# Patient Record
Sex: MALE | Employment: FULL TIME | URBAN - METROPOLITAN AREA
[De-identification: names, ages, dates, MRNs, and addresses within clinical notes are randomized per-mention and may not be internally consistent; named-entity substitution may affect disease eponyms.]

---

## 2017-10-06 ENCOUNTER — HOSPITAL ENCOUNTER (EMERGENCY)
Facility: CLINIC | Age: 22
Discharge: HOME | End: 2017-10-07
Attending: EMERGENCY MEDICINE | Admitting: EMERGENCY MEDICINE
Payer: COMMERCIAL

## 2017-10-06 DIAGNOSIS — R07.89 CHEST WALL PAIN: ICD-10-CM

## 2017-10-06 PROCEDURE — 99284 EMERGENCY DEPT VISIT MOD MDM: CPT | Mod: 25

## 2017-10-06 PROCEDURE — 93005 ELECTROCARDIOGRAM TRACING: CPT

## 2017-10-06 NOTE — ED AVS SNAPSHOT
Emergency Department    [address] [place] [id number]    Phone:  [phone]    Fax:  [phone]                                       Noe Jordan   MRN: [id number]    Department:   Emergency Department   Date of Visit:  10/6/2017           Patient Information     Date Of Birth          1995        Your diagnoses for this visit were:     Chest wall pain        You were seen by Jay Ang MD.      Follow-up Information     Follow up with No Ref-Primary, Physician In 1 week.    Why:  As needed        Discharge Instructions       Discharge Instructions  Chest Pain    You have been seen today for chest pain or discomfort.  At this time, your doctor has found no signs that your chest pain is due to a serious or life-threatening condition, (or you have declined more testing and/or admission to the hospital). However, sometimes there is a serious problem that does not show up right away. Your evaluation today may not be complete and you may need further testing and evaluation.     You need to follow-up with your regular doctor within 3 days.    Return to the Emergency Department if:    Your chest pain changes, gets worse, starts to happen more often, or comes with less activity.    You are short of breath.    You get very weak or tired.    You pass out or faint.    You have any new symptoms, like fever, cough, numb legs, or you cough up blood.    You have anything else that worries you.    Until you follow-up with your regular doctor please do the following:    Take one aspirin daily unless you have an allergy or are told not to by your doctor.    If a stress test appointment has been made, go to the appointment.    If you have questions, contact your regular doctor.    If your doctor today has told you to follow-up with your regular doctor, it is very important that you make an appointment with your clinic and go to the appointment.  If you do not follow-up with your  primary doctor, it may result in missing an important development which could result in permanent injury or disability and/or lasting pain.  If there is any problem keeping your appointment, call your doctor or return to the Emergency Department.    If you were given a prescription for medicine here today, be sure to read all of the information (including the package insert) that comes with your prescription.  This will include important information about the medicine, its side effects, and any warnings that you need to know about.  The pharmacist who fills the prescription can provide more information and answer questions you may have about the medicine.  If you have questions or concerns that the pharmacist cannot address, please call or return to the Emergency Department.     Opioid Medication Information    Pain medications are among the most commonly prescribed medicines, so we are including this information for all our patients. If you did not receive pain medication or get a prescription for pain medicine, you can ignore it.     You may have been given a prescription for an opioid (narcotic) pain medicine and/or have received a pain medicine while here in the Emergency Department. These medicines can make you drowsy or impaired. You must not drive, operate dangerous equipment, or engage in any other dangerous activities while taking these medications. If you drive while taking these medications, you could be arrested for DUI, or driving under the influence. Do not drink any alcohol while you are taking these medications.     Opioid pain medications can cause addiction. If you have a history of chemical dependency of any type, you are at a higher risk of becoming addicted to pain medications.  Only take these prescribed medications to treat your pain when all other options have been tried. Take it for as short a time and as few doses as possible. Store your pain pills in a secure place, as they are frequently  stolen and provide a dangerous opportunity for children or visitors in your house to start abusing these powerful medications. We will not replace any lost or stolen medicine.  As soon as your pain is better, you should flush all your remaining medication.     Many prescription pain medications contain Tylenol  (acetaminophen), including Vicodin , Tylenol #3 , Norco , Lortab , and Percocet .  You should not take any extra pills of Tylenol  if you are using these prescription medications or you can get very sick.  Do not ever take more than 3000 mg of acetaminophen in any 24 hour period.    All opioids tend to cause constipation. Drink plenty of water and eat foods that have a lot of fiber, such as fruits, vegetables, prune juice, apple juice and high fiber cereal.  Take a laxative if you don t move your bowels at least every other day. Miralax , Milk of Magnesia, Colace , or Senna  can be used to keep you regular.      Remember that you can always come back to the Emergency Department if you are not able to see your regular doctor in the amount of time listed above, if you get any new symptoms, or if there is anything that worries you.          24 Hour Appointment Hotline       To make an appointment at any [facility], call [phone] ([phone]). If you don't have a family doctor or clinic, we will help you find one. [facility] are conveniently located to serve the needs of you and your family.             Review of your medicines      START taking        Dose / Directions Last dose taken    HYDROcodone-acetaminophen 5-325 MG per tablet   Commonly known as:  NORCO   Dose:  1-2 tablet   Quantity:  20 tablet        Take 1-2 tablets by mouth every 4 hours as needed for moderate to severe pain maximum 6 tablet(s) per day   Refills:  0          Our records show that you are taking the medicines listed below. If these are incorrect, please call your family doctor or clinic.        Dose / Directions  Last dose taken    CITALOPRAM HYDROBROMIDE PO   Dose:  10 mg        Take 10 mg by mouth   Refills:  0        ibuprofen 600 MG tablet   Commonly known as:  ADVIL/MOTRIN   Dose:  600 mg   Quantity:  30 tablet        Take 1 tablet (600 mg) by mouth every 6 hours as needed for moderate pain   Refills:  1        Psyllium 48.57 % Powd        Refills:  0        TYLENOL PO   Dose:  1000 mg        Take 1,000 mg by mouth   Refills:  0        VITAMIN D3 PO   Dose:  1000 Units        Take 1,000 Units by mouth daily   Refills:  0                Prescriptions were sent or printed at these locations (2 Prescriptions)                   Other Prescriptions                Printed at Department/Unit printer (2 of 2)         ibuprofen (ADVIL/MOTRIN) 600 MG tablet               HYDROcodone-acetaminophen (NORCO) 5-325 MG per tablet                Procedures and tests performed during your visit     Chest XR,  PA & LAT    EKG 12 lead      Orders Needing Specimen Collection     None      Pending Results     No orders found for last 3 day(s).            Pending Culture Results     No orders found for last 3 day(s).            Pending Results Instructions     If you had any lab results that were not finalized at the time of your Discharge, you can call the ED Lab Result RN at [phone]. You will be contacted by this team for any positive Lab results or changes in treatment. The nurses are available 7 days a week from 10A to 6:30P.  You can leave a message 24 hours per day and they will return your call.        Test Results From Your Hospital Stay        10/7/2017 12:48 AM      Narrative     XR CHEST 2 VW   10/7/2017 12:41 AM     INDICATION: Chest pain.    COMPARISON: 10/15/2016.        Impression     IMPRESSION: No infiltrates or other acute findings. Heart size is  within normal limits. No pneumothorax. No change.    TAMY PANG MD                Clinical Quality Measure: Blood Pressure Screening     Your blood pressure was checked  "while you were in the emergency department today. The last reading we obtained was  BP: 142/85 . Please read the guidelines below about what these numbers mean and what you should do about them.  If your systolic blood pressure (the top number) is less than 120 and your diastolic blood pressure (the bottom number) is less than 80, then your blood pressure is normal. There is nothing more that you need to do about it.  If your systolic blood pressure (the top number) is 120-139 or your diastolic blood pressure (the bottom number) is 80-89, your blood pressure may be higher than it should be. You should have your blood pressure rechecked within a year by a primary care provider.  If your systolic blood pressure (the top number) is 140 or greater or your diastolic blood pressure (the bottom number) is 90 or greater, you may have high blood pressure. High blood pressure is treatable, but if left untreated over time it can put you at risk for heart attack, stroke, or kidney failure. You should have your blood pressure rechecked by a primary care provider within the next 4 weeks.  If your provider in the emergency department today gave you specific instructions to follow-up with your doctor or provider even sooner than that, you should follow that instruction and not wait for up to 4 weeks for your follow-up visit.        Thank you for choosing [facility]       Thank you for choosing [facility] for your care. Our goal is always to provide you with excellent care. Hearing back from our patients is one way we can continue to improve our services. Please take a few minutes to complete the written survey that you may receive in the mail after you visit with us. Thank you!        [employer] Information     [employer] lets you send messages to your doctor, view your test results, renew your prescriptions, schedule appointments and more. To sign up, go to [email or web address] . Click on \"Log in\" on the left side of the screen, which " "will take you to the Welcome page. Then click on \"Sign up Now\" on the right side of the page.     You will be asked to enter the access code listed below, as well as some personal information. Please follow the directions to create your username and password.     Your access code is: [email or web address]  Expires: 2018  1:57 AM     Your access code will  in 90 days. If you need help or a new code, please call your [facility] clinic or [phone].        Care EveryWhere ID     This is your Care EveryWhere ID. This could be used by other organizations to access your [facility] medical records  [id number]        Equal Access to Services     VANESSA CARDENAS : Hong Finney, karina griffith, shmuel rodríguez, farhana bourne. So [facility] [phone].    ATENCIÓN: Si habla español, tiene a phoenix disposición servicios gratuitos de asistencia lingüística. Llame al [phone].    We comply with applicable federal civil rights laws and [place] laws. We do not discriminate on the basis of race, color, national origin, age, disability, sex, sexual orientation, or gender identity.            After Visit Summary       This is your record. Keep this with you and show to your community pharmacist(s) and doctor(s) at your next visit.                  "

## 2017-10-06 NOTE — ED AVS SNAPSHOT
Emergency Department    [address] [place] [address]    Phone:  [phone]    Fax:  [phone]                                       Noe Jordan   MRN: [id number]    Department:   Emergency Department   Date of Visit:  10/6/2017           After Visit Summary Signature Page     I have received my discharge instructions, and my questions have been answered. I have discussed any challenges I see with this plan with the nurse or doctor.    ..........................................................................................................................................  Patient/Patient Representative Signature      ..........................................................................................................................................  Patient Representative Print Name and Relationship to Patient    ..................................................               ................................................  Date                                            Time    ..........................................................................................................................................  Reviewed by Signature/Title    ...................................................              ..............................................  Date                                                            Time

## 2017-10-07 ENCOUNTER — APPOINTMENT (OUTPATIENT)
Dept: GENERAL RADIOLOGY | Facility: CLINIC | Age: 22
End: 2017-10-07
Attending: EMERGENCY MEDICINE
Payer: COMMERCIAL

## 2017-10-07 VITALS
SYSTOLIC BLOOD PRESSURE: 127 MMHG | HEIGHT: 69 IN | WEIGHT: 175.71 LBS | OXYGEN SATURATION: 100 % | HEART RATE: 73 BPM | TEMPERATURE: 98 F | DIASTOLIC BLOOD PRESSURE: 70 MMHG | RESPIRATION RATE: 18 BRPM | BODY MASS INDEX: 26.02 KG/M2

## 2017-10-07 LAB — INTERPRETATION ECG - MUSE: NORMAL

## 2017-10-07 PROCEDURE — 25000132 ZZH RX MED GY IP 250 OP 250 PS 637: Performed by: EMERGENCY MEDICINE

## 2017-10-07 PROCEDURE — 71020 XR CHEST 2 VW: CPT

## 2017-10-07 RX ORDER — HYDROCODONE BITARTRATE AND ACETAMINOPHEN 5; 325 MG/1; MG/1
1-2 TABLET ORAL EVERY 4 HOURS PRN
Qty: 20 TABLET | Refills: 0 | Status: SHIPPED | OUTPATIENT
Start: 2017-10-07

## 2017-10-07 RX ORDER — IBUPROFEN 600 MG/1
600 TABLET, FILM COATED ORAL EVERY 6 HOURS PRN
Qty: 30 TABLET | Refills: 1 | Status: SHIPPED | OUTPATIENT
Start: 2017-10-07

## 2017-10-07 RX ORDER — IBUPROFEN 600 MG/1
600 TABLET, FILM COATED ORAL ONCE
Status: COMPLETED | OUTPATIENT
Start: 2017-10-07 | End: 2017-10-07

## 2017-10-07 RX ORDER — HYDROCODONE BITARTRATE AND ACETAMINOPHEN 5; 325 MG/1; MG/1
1 TABLET ORAL ONCE
Status: COMPLETED | OUTPATIENT
Start: 2017-10-07 | End: 2017-10-07

## 2017-10-07 RX ADMIN — IBUPROFEN 600 MG: 600 TABLET ORAL at 00:53

## 2017-10-07 RX ADMIN — HYDROCODONE BITARTRATE AND ACETAMINOPHEN 1 TABLET: 5; 325 TABLET ORAL at 00:53

## 2017-10-07 ASSESSMENT — ENCOUNTER SYMPTOMS: ABDOMINAL PAIN: 0

## 2017-10-07 NOTE — ED PROVIDER NOTES
"  History     Chief Complaint:  Chest Pain     HPI    Noe Jordan is a 22 year old male who presents to the emergency department today for evaluation of chest pain. The patient reports sudden onset of left-sided chest pain around his left pectoral that started around 2300, 1 hour prior to his visit to the ED, that increases in the pain with deep expiratory breathing. The patient reports previous episode of similar pain that lasted only an hour, but did not get it evaluated. The patient denies taking any pain medications prior to his visit to the ED and also denies leg swelling, abdominal pain, or increased chest pain with moving his arms.     Cardiac/PE/DVT Risk Factors:  History of smoking - Negative   Personal history of PE/DVT - Negative  Family history of PE/DVT - Negative  Recent surgery - Negative     Allergies:  No Known Drug Allergies    Medications:    Ibuprofen  Acetaminophen  Citalopram    Past Medical History:    Medical history reviewed. No pertinent medical history.    Past Surgical History:    Surgical history reviewed. No pertinent surgical history.    Family History:    Family history reviewed. No pertinent family history.     Social History:  The patient was accompanied to the ED by friend.  Smoking Status: Never Smoker  Smokeless Tobacco: Never Used  Alcohol Use: Negative   Marital Status:  Single [1]     Review of Systems   Cardiovascular: Negative for leg swelling. Chest pain: left sided chest pain increased with deep expiratory breathing.   Gastrointestinal: Negative for abdominal pain.   All other systems reviewed and are negative.    Physical Exam     Patient Vitals for the past 24 hrs:   BP Temp Temp src Pulse Resp SpO2 Height Weight   10/07/17 0216 127/70 - - 73 - 100 % - -   10/07/17 0020 - 98  F (36.7  C) Oral - - - - -   10/07/17 0015 - - - - - 100 % - -   10/07/17 0013 142/85 - - - - - - -   10/06/17 2338 149/48 97.1  F (36.2  C) Temporal 85 18 100 % 1.753 m (5' 9\") 79.7 kg " (175 lb 11.3 oz)     Physical Exam   Constitutional: He is oriented to person, place, and time. He appears well-developed.   HENT:   Head: Normocephalic and atraumatic.   Right Ear: External ear normal.   Mouth/Throat: Oropharynx is clear and moist.   Eyes: Conjunctivae and EOM are normal. Pupils are equal, round, and reactive to light.   Neck: Normal range of motion. Neck supple. No JVD present.   Cardiovascular: Normal rate, regular rhythm and normal heart sounds.    Pulmonary/Chest: Effort normal and breath sounds normal.   Abdominal: Soft. Bowel sounds are normal. He exhibits no distension. There is no tenderness. There is no rebound.   Musculoskeletal: Normal range of motion.   Lymphadenopathy:     He has no cervical adenopathy.   Neurological: He is alert and oriented to person, place, and time. He displays normal reflexes. No cranial nerve deficit. He exhibits normal muscle tone. Coordination normal.   Skin: Skin is warm and dry.   Psychiatric: He has a normal mood and affect. His behavior is normal. Judgment normal.         Emergency Department Course     ECG:  ECG taken at 2343, ECG read at 0028  Normal sinus rhythm  Normal ECG  Rate 75 bpm. TN interval 134 ms. QRS duration 84 ms. QT/QTc 338/377 ms. P-R-T axes 61 82 58.    Imaging:  Radiology findings were communicated with the patient who voiced understanding of the findings.    Chest XR,  PA & LAT  1. No infiltrates or other acute findings. Heart size is  within normal limits. No pneumothorax. No change.  TAMY PANG MD  Reading per radiology    Interventions:  0053 Ibuprofen 600 mg PO  0053 Norco 1 tablet PO    Emergency Department Course:    2338 Nursing notes and vitals reviewed.    0019 I performed an exam of the patient as documented above.     0030 The patient was sent for a Chest XR,  PA & LAT while in the emergency department, results above.     0141 I discussed the treatment plan with the patient. They expressed understanding of this plan  and consented to discharge. They will be discharged home with instructions for care and follow up. In addition, the patient will return to the emergency department if their symptoms persist, worsen, if new symptoms arise or if there is any concern.  All questions were answered.    0141 I personally reviewed the ECG and imaging results with the patient and answered all related questions prior to discharge.    Impression & Plan      Medical Decision Making:  Noe Jordan is a 22 year old male who presents to the emergency department today for evaluation of left sided chest pain. The patient, after being seen, does admit to smiling or laughing and then developing chest pain at home. I suspect costochondral or intracostal pain or pleural pain. Chest Xray is negative for pneumothorax. The patient is not at risk for PE, therefore the risk for PE study is greater than his risk for the abnormality so no investigation of this was recommended. The patient's pain seemed to be improved with Ibuprofen and Norco and will continue this as a bridge to follow up with primary care. There is no concern for intraabdominal pathology or pericarditis by ECG    Diagnosis:  1.Left sided chest wall pain suspect costochondritis.     Disposition:   The patient is discharged to home.    Discharge Medications:  Discharge Medication List as of 10/7/2017  1:58 AM      START taking these medications    Details   HYDROcodone-acetaminophen (NORCO) 5-325 MG per tablet Take 1-2 tablets by mouth every 4 hours as needed for moderate to severe pain maximum 6 tablet(s) per day, Disp-20 tablet, R-0, Local Print           Scribe Disclosure:  Bennie LAUGHLIN, am serving as a scribe at 12:12 AM on 10/7/2017 to document services personally performed by Jay Ang MD based on my observations and the provider's statements to me.     EMERGENCY DEPARTMENT       Jay Ang MD  10/10/17 4954

## 2021-08-25 ENCOUNTER — APPOINTMENT (OUTPATIENT)
Dept: ULTRASOUND IMAGING | Facility: CLINIC | Age: 26
End: 2021-08-25
Attending: EMERGENCY MEDICINE

## 2021-08-25 ENCOUNTER — HOSPITAL ENCOUNTER (EMERGENCY)
Facility: CLINIC | Age: 26
Discharge: HOME | End: 2021-08-26
Attending: EMERGENCY MEDICINE | Admitting: EMERGENCY MEDICINE

## 2021-08-25 VITALS
OXYGEN SATURATION: 99 % | BODY MASS INDEX: 28.8 KG/M2 | HEART RATE: 72 BPM | SYSTOLIC BLOOD PRESSURE: 159 MMHG | DIASTOLIC BLOOD PRESSURE: 83 MMHG | WEIGHT: 195 LBS | TEMPERATURE: 98.2 F

## 2021-08-25 DIAGNOSIS — N50.811 TESTICULAR PAIN, RIGHT: ICD-10-CM

## 2021-08-25 LAB
ALBUMIN UR-MCNC: NEGATIVE MG/DL
APPEARANCE UR: CLEAR
BILIRUB UR QL STRIP: NEGATIVE
COLOR UR AUTO: ABNORMAL
GLUCOSE UR STRIP-MCNC: NEGATIVE MG/DL
HGB UR QL STRIP: NEGATIVE
KETONES UR STRIP-MCNC: NEGATIVE MG/DL
LEUKOCYTE ESTERASE UR QL STRIP: NEGATIVE
MUCOUS THREADS #/AREA URNS LPF: PRESENT /LPF
NITRATE UR QL: NEGATIVE
PH UR STRIP: 6 [PH] (ref 5–7)
RBC URINE: 1 /HPF
SP GR UR STRIP: 1.02 (ref 1–1.03)
SQUAMOUS EPITHELIAL: <1 /HPF
UROBILINOGEN UR STRIP-MCNC: NORMAL MG/DL
WBC URINE: 1 /HPF

## 2021-08-25 PROCEDURE — 76870 US EXAM SCROTUM: CPT

## 2021-08-25 PROCEDURE — 81001 URINALYSIS AUTO W/SCOPE: CPT | Performed by: EMERGENCY MEDICINE

## 2021-08-25 PROCEDURE — 99284 EMERGENCY DEPT VISIT MOD MDM: CPT | Mod: 25

## 2021-08-26 NOTE — ED PROVIDER NOTES
History   Chief Complaint:  Testicular Pain     HPI   Noe Jordan is a 26 year old male who presents with right testicular discomfort and swelling.  The patient reports that about a month ago he developed discomfort, swelling, and mild pain in his right testicle.  Over the past month, this swelling has slightly increased, as well as his pain.  On arrival today, he describes his pain as a 3 out of 10, and notes that it is mild.  He mentions that the reason that he came into the ED this evening was because he spoke with a nurse earlier in the day and was referred here from the conversation.  No injuries or trauma to the area, and otherwise the patient feels completely fine.    Review of Systems   Genitourinary: Positive for scrotal swelling and testicular pain.   All other systems reviewed and are negative.    Medications:  Citalopram  Norco    Past Medical History:    The patient denies any medical problems.     Social History:  Presents to ED with family member.    Physical Exam     Patient Vitals for the past 24 hrs:   BP Temp Temp src Pulse SpO2 Weight   08/25/21 2243 (!) 159/83 98.2  F (36.8  C) Oral 72 99 % 88.5 kg (195 lb)       Physical Exam  General/Appearance: appears stated age, well-groomed, appears comfortable  Eyes: EOMI, no scleral injection, no icterus  ENT: MMM  Neck: supple, nl ROM, no stiffness  Respiratory: no increased WOB  GI: abd soft, non-distended, nttp,  no HSM, no rebound, no guarding, nl BS  :: nl bilateral testicular lie, no testicular swelling/lesions/ttp/masses. No penile lesions or purulent drainage.  MSK: VAZQUEZ, good tone, no bony abnormality  Skin: warm and well-perfused, no rash, no edema, no ecchymosis, nl turgor  Neuro: GCS 15, alert and oriented, no gross focal neuro deficits  Psych: interacts appropriately  Heme: no petechia, no purpura, no active bleeding    Emergency Department Course   Imaging:  US Testicular & Scrotum w Doppler Ltd  1.  No etiology for  symptoms. No mass, torsion or hyperemia evident.  Reading per radiology    Laboratory:  UA: Mucous present (A), o/w Negative     Procedures  None    Emergency Department Course:  Reviewed:  2250 I reviewed the patient's nursing notes, vitals, past medical records, Care Everywhere.     Assessments/Consults:  2253 I performed an exam as documented above.  ED Course as of Aug 26 0006   Wed Aug 25, 2021   8707 I rechecked the patient and reevaluated their symptoms.            Disposition:  The patient was discharged to home.     Impression & Plan   Medical Decision Making:  Noe Jordan is a 26 year old male with history of right testicular pain and mild swelling that prompted an ER visit.  The symptoms have been present for a month and have a slightly increased to a pain that is a 3 out of 10.  Physical exam is unremarkable and the patient appears very comfortable so I have low suspicion for torsion.  Ultrasound was obtained which also did not show varicocele, significant hydrocele, epididymitis or orchitis.  It also was obviously negative for torsion.  I do not understand exactly what is causing his low level discomfort but I do wonder if it has to do with poor support with his underwear so I did recommend wearing compression shorts.  I recommended laying on physical activity and if the patient is still having symptoms in 7 to 10 days to follow-up with urology as needed.    Diagnosis:    ICD-10-CM    1. Testicular pain, right  N50.811        Scribe Disclosure:  I, Justice Love, am serving as a scribe at 10:52 PM on 8/25/2021 to document services personally performed by Katerina Bernabe MD based on my observations and the provider's statements to me.      Katerina Bernabe MD  08/26/21 0013